# Patient Record
Sex: FEMALE | HISPANIC OR LATINO | Employment: UNEMPLOYED | URBAN - METROPOLITAN AREA
[De-identification: names, ages, dates, MRNs, and addresses within clinical notes are randomized per-mention and may not be internally consistent; named-entity substitution may affect disease eponyms.]

---

## 2019-10-31 ENCOUNTER — HOSPITAL ENCOUNTER (OUTPATIENT)
Dept: RADIOLOGY | Facility: MEDICAL CENTER | Age: 64
End: 2019-10-31
Attending: ORTHOPAEDIC SURGERY
Payer: MEDICAID

## 2019-10-31 DIAGNOSIS — Z01.811 PRE-OPERATIVE RESPIRATORY EXAMINATION: ICD-10-CM

## 2019-10-31 DIAGNOSIS — Z01.812 PRE-OPERATIVE LABORATORY EXAMINATION: ICD-10-CM

## 2019-10-31 DIAGNOSIS — Z01.810 PRE-OPERATIVE CARDIOVASCULAR EXAMINATION: ICD-10-CM

## 2019-10-31 LAB
ANION GAP SERPL CALC-SCNC: 9 MMOL/L (ref 0–11.9)
APTT PPP: 29 SEC (ref 24.7–36)
BASOPHILS # BLD AUTO: 0.7 % (ref 0–1.8)
BASOPHILS # BLD: 0.05 K/UL (ref 0–0.12)
BUN SERPL-MCNC: 16 MG/DL (ref 8–22)
CALCIUM SERPL-MCNC: 10 MG/DL (ref 8.5–10.5)
CHLORIDE SERPL-SCNC: 106 MMOL/L (ref 96–112)
CO2 SERPL-SCNC: 28 MMOL/L (ref 20–33)
CREAT SERPL-MCNC: 0.71 MG/DL (ref 0.5–1.4)
EKG IMPRESSION: NORMAL
EOSINOPHIL # BLD AUTO: 0.26 K/UL (ref 0–0.51)
EOSINOPHIL NFR BLD: 3.6 % (ref 0–6.9)
ERYTHROCYTE [DISTWIDTH] IN BLOOD BY AUTOMATED COUNT: 46.1 FL (ref 35.9–50)
ERYTHROCYTE [SEDIMENTATION RATE] IN BLOOD BY WESTERGREN METHOD: 12 MM/HOUR (ref 0–30)
EST. AVERAGE GLUCOSE BLD GHB EST-MCNC: 123 MG/DL
GLUCOSE SERPL-MCNC: 88 MG/DL (ref 65–99)
HBA1C MFR BLD: 5.9 % (ref 0–5.6)
HCT VFR BLD AUTO: 42.6 % (ref 37–47)
HGB BLD-MCNC: 13.9 G/DL (ref 12–16)
IMM GRANULOCYTES # BLD AUTO: 0.01 K/UL (ref 0–0.11)
IMM GRANULOCYTES NFR BLD AUTO: 0.1 % (ref 0–0.9)
INR PPP: 0.93 (ref 0.87–1.13)
LYMPHOCYTES # BLD AUTO: 2.39 K/UL (ref 1–4.8)
LYMPHOCYTES NFR BLD: 33.1 % (ref 22–41)
MCH RBC QN AUTO: 30.2 PG (ref 27–33)
MCHC RBC AUTO-ENTMCNC: 32.6 G/DL (ref 33.6–35)
MCV RBC AUTO: 92.4 FL (ref 81.4–97.8)
MONOCYTES # BLD AUTO: 0.41 K/UL (ref 0–0.85)
MONOCYTES NFR BLD AUTO: 5.7 % (ref 0–13.4)
NEUTROPHILS # BLD AUTO: 4.09 K/UL (ref 2–7.15)
NEUTROPHILS NFR BLD: 56.8 % (ref 44–72)
NRBC # BLD AUTO: 0 K/UL
NRBC BLD-RTO: 0 /100 WBC
PLATELET # BLD AUTO: 263 K/UL (ref 164–446)
PMV BLD AUTO: 9.3 FL (ref 9–12.9)
POTASSIUM SERPL-SCNC: 4.1 MMOL/L (ref 3.6–5.5)
PROTHROMBIN TIME: 12.6 SEC (ref 12–14.6)
RBC # BLD AUTO: 4.61 M/UL (ref 4.2–5.4)
SODIUM SERPL-SCNC: 143 MMOL/L (ref 135–145)
WBC # BLD AUTO: 7.2 K/UL (ref 4.8–10.8)

## 2019-10-31 PROCEDURE — 93005 ELECTROCARDIOGRAM TRACING: CPT | Performed by: ORTHOPAEDIC SURGERY

## 2019-10-31 PROCEDURE — 80048 BASIC METABOLIC PNL TOTAL CA: CPT

## 2019-10-31 PROCEDURE — 71046 X-RAY EXAM CHEST 2 VIEWS: CPT

## 2019-10-31 PROCEDURE — 85025 COMPLETE CBC W/AUTO DIFF WBC: CPT

## 2019-10-31 PROCEDURE — 85730 THROMBOPLASTIN TIME PARTIAL: CPT

## 2019-10-31 PROCEDURE — 85652 RBC SED RATE AUTOMATED: CPT

## 2019-10-31 PROCEDURE — 93010 ELECTROCARDIOGRAM REPORT: CPT | Performed by: INTERNAL MEDICINE

## 2019-10-31 PROCEDURE — 85610 PROTHROMBIN TIME: CPT

## 2019-10-31 PROCEDURE — 83036 HEMOGLOBIN GLYCOSYLATED A1C: CPT

## 2019-10-31 PROCEDURE — 36415 COLL VENOUS BLD VENIPUNCTURE: CPT

## 2019-10-31 RX ORDER — ATORVASTATIN CALCIUM 20 MG/1
20 TABLET, FILM COATED ORAL NIGHTLY
COMMUNITY

## 2019-10-31 RX ORDER — RISEDRONATE SODIUM 5 MG/1
5 TABLET, FILM COATED ORAL EVERY MORNING
COMMUNITY

## 2019-10-31 RX ORDER — SENNOSIDES 8.6 MG
650 CAPSULE ORAL PRN
COMMUNITY

## 2019-11-02 ENCOUNTER — ANESTHESIA (OUTPATIENT)
Dept: SURGERY | Facility: MEDICAL CENTER | Age: 64
End: 2019-11-02
Payer: MEDICAID

## 2019-11-02 ENCOUNTER — HOSPITAL ENCOUNTER (OUTPATIENT)
Facility: MEDICAL CENTER | Age: 64
End: 2019-11-02
Attending: ORTHOPAEDIC SURGERY | Admitting: ORTHOPAEDIC SURGERY
Payer: MEDICAID

## 2019-11-02 ENCOUNTER — ANESTHESIA EVENT (OUTPATIENT)
Dept: SURGERY | Facility: MEDICAL CENTER | Age: 64
End: 2019-11-02
Payer: MEDICAID

## 2019-11-02 VITALS
RESPIRATION RATE: 16 BRPM | HEART RATE: 66 BPM | WEIGHT: 144.62 LBS | OXYGEN SATURATION: 99 % | BODY MASS INDEX: 26.61 KG/M2 | TEMPERATURE: 97.5 F | DIASTOLIC BLOOD PRESSURE: 73 MMHG | SYSTOLIC BLOOD PRESSURE: 112 MMHG | HEIGHT: 62 IN

## 2019-11-02 DIAGNOSIS — G89.18 POST-OP PAIN: ICD-10-CM

## 2019-11-02 PROCEDURE — 160025 RECOVERY II MINUTES (STATS): Performed by: ORTHOPAEDIC SURGERY

## 2019-11-02 PROCEDURE — 160035 HCHG PACU - 1ST 60 MINS PHASE I: Performed by: ORTHOPAEDIC SURGERY

## 2019-11-02 PROCEDURE — 160046 HCHG PACU - 1ST 60 MINS PHASE II: Performed by: ORTHOPAEDIC SURGERY

## 2019-11-02 PROCEDURE — 160039 HCHG SURGERY MINUTES - EA ADDL 1 MIN LEVEL 3: Performed by: ORTHOPAEDIC SURGERY

## 2019-11-02 PROCEDURE — 500881 HCHG PACK, EXTREMITY: Performed by: ORTHOPAEDIC SURGERY

## 2019-11-02 PROCEDURE — 501838 HCHG SUTURE GENERAL: Performed by: ORTHOPAEDIC SURGERY

## 2019-11-02 PROCEDURE — 160002 HCHG RECOVERY MINUTES (STAT): Performed by: ORTHOPAEDIC SURGERY

## 2019-11-02 PROCEDURE — 700105 HCHG RX REV CODE 258: Performed by: ORTHOPAEDIC SURGERY

## 2019-11-02 PROCEDURE — 160048 HCHG OR STATISTICAL LEVEL 1-5: Performed by: ORTHOPAEDIC SURGERY

## 2019-11-02 PROCEDURE — 160009 HCHG ANES TIME/MIN: Performed by: ORTHOPAEDIC SURGERY

## 2019-11-02 PROCEDURE — 700111 HCHG RX REV CODE 636 W/ 250 OVERRIDE (IP): Performed by: ANESTHESIOLOGY

## 2019-11-02 PROCEDURE — 700101 HCHG RX REV CODE 250: Performed by: ANESTHESIOLOGY

## 2019-11-02 PROCEDURE — 700101 HCHG RX REV CODE 250: Performed by: ORTHOPAEDIC SURGERY

## 2019-11-02 PROCEDURE — 160028 HCHG SURGERY MINUTES - 1ST 30 MINS LEVEL 3: Performed by: ORTHOPAEDIC SURGERY

## 2019-11-02 RX ORDER — MIDAZOLAM HYDROCHLORIDE 1 MG/ML
1 INJECTION INTRAMUSCULAR; INTRAVENOUS
Status: DISCONTINUED | OUTPATIENT
Start: 2019-11-02 | End: 2019-11-02 | Stop reason: HOSPADM

## 2019-11-02 RX ORDER — SODIUM CHLORIDE, SODIUM LACTATE, POTASSIUM CHLORIDE, CALCIUM CHLORIDE 600; 310; 30; 20 MG/100ML; MG/100ML; MG/100ML; MG/100ML
INJECTION, SOLUTION INTRAVENOUS CONTINUOUS
Status: DISCONTINUED | OUTPATIENT
Start: 2019-11-02 | End: 2019-11-02

## 2019-11-02 RX ORDER — OXYCODONE HYDROCHLORIDE AND ACETAMINOPHEN 5; 325 MG/1; MG/1
1 TABLET ORAL
Status: DISCONTINUED | OUTPATIENT
Start: 2019-11-02 | End: 2019-11-02 | Stop reason: HOSPADM

## 2019-11-02 RX ORDER — HALOPERIDOL 5 MG/ML
1 INJECTION INTRAMUSCULAR
Status: DISCONTINUED | OUTPATIENT
Start: 2019-11-02 | End: 2019-11-02 | Stop reason: HOSPADM

## 2019-11-02 RX ORDER — MELATONIN
2000
Refills: 4 | COMMUNITY
Start: 2019-08-20

## 2019-11-02 RX ORDER — MEPERIDINE HYDROCHLORIDE 25 MG/ML
INJECTION INTRAMUSCULAR; INTRAVENOUS; SUBCUTANEOUS PRN
Status: DISCONTINUED | OUTPATIENT
Start: 2019-11-02 | End: 2019-11-02 | Stop reason: SURG

## 2019-11-02 RX ORDER — MEPERIDINE HYDROCHLORIDE 25 MG/ML
25 INJECTION INTRAMUSCULAR; INTRAVENOUS; SUBCUTANEOUS
Status: DISCONTINUED | OUTPATIENT
Start: 2019-11-02 | End: 2019-11-02 | Stop reason: HOSPADM

## 2019-11-02 RX ORDER — ONDANSETRON 2 MG/ML
4 INJECTION INTRAMUSCULAR; INTRAVENOUS
Status: DISCONTINUED | OUTPATIENT
Start: 2019-11-02 | End: 2019-11-02 | Stop reason: HOSPADM

## 2019-11-02 RX ORDER — OXYCODONE HYDROCHLORIDE AND ACETAMINOPHEN 5; 325 MG/1; MG/1
2 TABLET ORAL
Status: DISCONTINUED | OUTPATIENT
Start: 2019-11-02 | End: 2019-11-02 | Stop reason: HOSPADM

## 2019-11-02 RX ORDER — SODIUM CHLORIDE, SODIUM LACTATE, POTASSIUM CHLORIDE, CALCIUM CHLORIDE 600; 310; 30; 20 MG/100ML; MG/100ML; MG/100ML; MG/100ML
INJECTION, SOLUTION INTRAVENOUS CONTINUOUS
Status: DISCONTINUED | OUTPATIENT
Start: 2019-11-02 | End: 2019-11-02 | Stop reason: HOSPADM

## 2019-11-02 RX ORDER — GABAPENTIN 300 MG/1
CAPSULE ORAL
Refills: 0 | COMMUNITY
Start: 2019-10-24

## 2019-11-02 RX ORDER — OMEPRAZOLE 20 MG/1
10 CAPSULE, DELAYED RELEASE ORAL DAILY
COMMUNITY

## 2019-11-02 RX ORDER — CEFAZOLIN SODIUM 1 G/3ML
INJECTION, POWDER, FOR SOLUTION INTRAMUSCULAR; INTRAVENOUS PRN
Status: DISCONTINUED | OUTPATIENT
Start: 2019-11-02 | End: 2019-11-02 | Stop reason: SURG

## 2019-11-02 RX ORDER — HYDROCODONE BITARTRATE AND ACETAMINOPHEN 5; 325 MG/1; MG/1
1-2 TABLET ORAL EVERY 6 HOURS PRN
Qty: 20 TAB | Refills: 0 | Status: SHIPPED | OUTPATIENT
Start: 2019-11-02 | End: 2019-11-09

## 2019-11-02 RX ORDER — DIPHENHYDRAMINE HYDROCHLORIDE 50 MG/ML
12.5 INJECTION INTRAMUSCULAR; INTRAVENOUS
Status: DISCONTINUED | OUTPATIENT
Start: 2019-11-02 | End: 2019-11-02 | Stop reason: HOSPADM

## 2019-11-02 RX ADMIN — SODIUM CHLORIDE, POTASSIUM CHLORIDE, SODIUM LACTATE AND CALCIUM CHLORIDE: 600; 310; 30; 20 INJECTION, SOLUTION INTRAVENOUS at 12:40

## 2019-11-02 RX ADMIN — ROCURONIUM BROMIDE 5 MG: 10 INJECTION, SOLUTION INTRAVENOUS at 13:57

## 2019-11-02 RX ADMIN — PROPOFOL 30 MG: 10 INJECTION, EMULSION INTRAVENOUS at 14:00

## 2019-11-02 RX ADMIN — LIDOCAINE HYDROCHLORIDE 0.5 ML: 10 INJECTION, SOLUTION INFILTRATION; PERINEURAL at 12:40

## 2019-11-02 RX ADMIN — SUCCINYLCHOLINE CHLORIDE 40 MG: 20 INJECTION, SOLUTION INTRAMUSCULAR; INTRAVENOUS at 14:00

## 2019-11-02 RX ADMIN — MEPERIDINE HYDROCHLORIDE 12.5 MG: 25 INJECTION INTRAMUSCULAR; INTRAVENOUS; SUBCUTANEOUS at 14:08

## 2019-11-02 RX ADMIN — ALFENTANIL HYDROCHLORIDE 750 MCG: 500 INJECTION INTRAVENOUS at 13:57

## 2019-11-02 RX ADMIN — PROPOFOL 20 MG: 10 INJECTION, EMULSION INTRAVENOUS at 13:57

## 2019-11-02 RX ADMIN — LIDOCAINE HYDROCHLORIDE 40 MG: 20 INJECTION, SOLUTION INTRAVENOUS at 13:57

## 2019-11-02 RX ADMIN — MEPERIDINE HYDROCHLORIDE 12.5 MG: 25 INJECTION INTRAMUSCULAR; INTRAVENOUS; SUBCUTANEOUS at 14:17

## 2019-11-02 RX ADMIN — CEFAZOLIN 2 G: 330 INJECTION, POWDER, FOR SOLUTION INTRAMUSCULAR; INTRAVENOUS at 13:55

## 2019-11-02 ASSESSMENT — PAIN SCALES - GENERAL: PAIN_LEVEL: 0

## 2019-11-02 NOTE — ANESTHESIA PREPROCEDURE EVALUATION
Relevant Problems   No relevant active problems       Physical Exam    Airway   Mallampati: II  TM distance: >3 FB  Neck ROM: full       Cardiovascular - normal exam  Rhythm: regular  Rate: normal     Dental - normal exam         Pulmonary - normal exam  Breath sounds clear to auscultation     Abdominal    Neurological - normal exam                 Anesthesia Plan    ASA 2       Plan - general       Airway plan will be LMA        Induction: intravenous    Postoperative Plan: Postoperative administration of opioids is intended.    Pertinent diagnostic labs and testing reviewed    Informed Consent:    Anesthetic plan and risks discussed with patient.    Use of blood products discussed with: patient whom consented to blood products.

## 2019-11-02 NOTE — ANESTHESIA POSTPROCEDURE EVALUATION
Patient: Mary Carter    Procedure Summary     Date:  11/02/19 Room / Location:  [facility] 07 / SURGERY [facility]    Anesthesia Start:  1355 Anesthesia Stop:  1430    Procedure:  RELEASE, CARPAL TUNNEL (Left Wrist) Diagnosis:  (LEFT CARPAL TUNNEL SYNDROME)    Surgeon:  Nikunj Vela M.D. Responsible Provider:  Eric Wilkes M.D.    Anesthesia Type:  general ASA Status:  2          Final Anesthesia Type: general  Last vitals  BP   Blood Pressure: 109/74    Temp   36.7 °C (98 °F)    Pulse   Pulse: 68   Resp   17    SpO2   97 %      Anesthesia Post Evaluation    Patient location during evaluation: PACU  Patient participation: complete - patient participated  Level of consciousness: awake and alert  Pain score: 0    Airway patency: patent  Anesthetic complications: no  Cardiovascular status: hemodynamically stable  Respiratory status: acceptable  Hydration status: euvolemic    PONV: none           Nurse Pain Score: 0 (NPRS)

## 2019-11-02 NOTE — ANESTHESIA PROCEDURE NOTES
Airway  Date/Time: 11/2/2019 2:01 PM  Performed by: Eric Wilkes M.D.  Authorized by: Eric Wilkes M.D.     Location:  OR  Urgency:  Elective  Indications for Airway Management:  Anesthesia  Spontaneous Ventilation: absent    Sedation Level:  Deep  Preoxygenated: Yes    Patient Position:  Sniffing  Final Airway Type:  Supraglottic airway  Final Supraglottic Airway:  Standard LMA  SGA Size:  4  Number of Attempts at Approach:  1

## 2019-11-02 NOTE — DISCHARGE INSTRUCTIONS
ACTIVIDAD: Descansa y tómalo con calma meghann las primeras 24 horas.  Se recomienda a un adulto responsable que permanezca con usted meghann paco tiempo.  Es normal sentir se sienta somnoliento.  Le animamos a no hacer nada que requiera equilibrio, juicio o coordinación.    LOS SÍNTOMAS LEVES SIMILARES A LOS DE LA GRIPE SON NORMALES. USTED PUEDE EXPERIMENTAR ACHES DE MASA GENERALIZADA, IRRITATION DE THROAT, HEADACHE Y/O ALGUNAS NÁUSEAS.    MEGHANN 24 HORAS NO:  Conducir, operar maquinaria o manejar electrodomésticos.  Mesha cerveza o bebidas alcohólicas.   [place] decisiones importantes o firme documentos legales.    INSTRUCCIONES ESPECIALES:   Retire el apósito en 4 días   Seguimiento con el Dr. Vela en 2 semanas    Instrucciones de descarga para la liberación del túnel carpiano  Te sometiste a un procedimiento de liberación del túnel carpiano para ayudar a aliviar los síntomas del síndrome del túnel carpiano. En el síndrome del túnel carpiano, un nervio en la jaziel se comprime e irrita. [place] causa entumecimiento y dolor en los dedos y la mano. La liberación del túnel carpiano facilita la compresión del nervio. Aquí hay instrucciones que le ayudarán a cuidar mckeon brazo y jaziel cuando esté en casa.  Atención domiciliaria  No agarres los objetos con fuerza ni levantes con el brazo afectado.  Use mckeon venda, férula o yafundida según las indicaciones de mckeon médico.  Mantenga siempre el apósito, la férula o el molde seco y limpio.  Cuando se duche, cúbrase la mano y la jaziel con plástico y use cinta adhesiva o bandas de goma para mantener el apósito, la férula o el yeso seco. Ducha según sea necesario.    [place]sa deborah bolsa de hielo, deborah bolsa de guisantes congelados o algo similar envuelto en deborah toalla delgada en la jaziel. Utilícelo para reducir la hinchazón meghann las primeras 48 horas. Dejar la bolsa de hielo encendida meghann 20 minutos y luego quitarla meghann 20 minutos. Repita según sea necesario.  Mantén el brazo  elevado por encima del corazón tez 24 a 48 horas después de la cirugía.  Rigoberto los ejercicios que aprendió en el hospital o según las instrucciones de mckeon médico.  [place] analgésicos según las instrucciones.  No conduzcas hasta que el médico te diga que está john. Nunca conduzca mientras esté tomando analgésicos opioides.  Pregúntale al médico cuándo puedes volver al trabajo. Si mckeon trabajo requiere levantar objetos pesados, es posible que no pueda volver a trabajar tez varias semanas.  Atención de seguimiento  Pide deborah consulta de seguimiento según las indicaciones de tu médico.  Llame al 911  Llame al 911 de inmediato si tiene alguno de los siguientes:  Dolor en el pecho  Dificultad para respirar  Cuándo llamar a mckeon proveedor de atención médica  Llame a mckeon proveedor de atención médica de inmediato si tiene alguno de los siguientes:  Deborah férula, yeso o apósito mojado  Aumento del sangrado o drenaje del pilar (incisión)  Apertura de la incisión  Fiebre de 100,4 oF (38 oC) o superior, o según las indicaciones de mckeon proveedor de atención médica  Escalofríos temblorosos  Cualquier nuevo entumecimiento en los dedos o el pulgar  Mano oxana o dedos  El dolor empeora con o sin actividad  El enrojecimiento, sensibilidad o hinchazón de la incisión empeora    DIETA: Para evitar las náuseas, avance lentamente la dieta según lo tolerado, evitando los alimentos picantes o grasosos tez el primer día.  Agregue alimentos más sustanciales a mckeon dieta de acuerdo con las instrucciones de mckeon médico.  Los bebés pueden ser alimentados con leche maternizada o leche materna tan pronto nael tengan hambre.  AUMENTA LOS FLUIDOS Y LA FIBRA PARA EVITAR EL CONSTIPATION.    SURGICAL DRESSING/BATHING: Retire el apósito en 4 días (el 6 de noviembre)    PAOLA SIGUIENTE:  Se debe concertar deborah paola de seguimiento con mckeon médico en 2 semanas; llamar a programar.    Debe LLAMAR A MCKEON Físico si desarrolla:  Fiebre superior a 101 grados FEfrain [employer] no  aliviado por medicamentos, o náuseas o vómitos persistentes.  Sangrado excesivo (zaid empapada a través del vendaje) o drenaje inesperado de la herida.  Enrojecimiento o hinchazón extremos alrededor del lugar de la incisión, drenaje de pus o drenaje con olor fétido.  Incapacidad para orinar o vaciar la vejiga en un plazo de 8 horas.  Problemas con la respiración o dolor en el pecho.    Debe llamar al 911 si presenta problemas con la respiración o dolor en el pecho.  Si no puede comunicarse con mckeon médico o centro quirúrgico, debe ir a la evita de emergencias o centro de atención de urgencia más cercano.  Teléfono del médico: [phone]    Si surge alguna pregunta, llame a mckeon médico.  Si mckeon médico no está disponible, no dude en llamar al [facility] al [phone].  [place] está abierto de lunes a viernes de 7AM a 7PM.  También puede llamar a la LÍNEA directa de neil abierta las 24 horas del día, los 7 dave de la semana y hablar con deborah enfermera al [phone], o al número gratuito [phone].    Deborah enfermera registrada puede llamarlo unos días después de la cirugía para zane cómo le va después del procedimiento.    MEDICAMENTOS: Reanudar la jose de medicamentos diarios.  [place] analgésicos recetados con alimentos.  Si no se receta ningún medicamento, puedes coyt analgésicos sin aspirina si es necesario.  LA MEDICACIÓN DEL DOLOR PUEDE SER MUY CONSTIPADO.  [place] un ablandador de heces o un laxante nael senokot, pericolace o leche de magnesia si es necesario.    Prescripción aisha para [place] (dolor).  [place] dosis de analgésico sin dolor debida en cualquier momento, ninguna por vía oral en [place].    Si mckeon médico le ha recetado analgésicos que incluyen paracetamol (Tylenol), no tome paracetamol adicional (Tylenol) mientras jose el medicamento prescrito.      ACTIVITY: Rest and take it easy for the first 24 hours.  A responsible adult is recommended to remain with you during that time.  It is normal  to feel sleepy.  We encourage you to not do anything that requires balance, judgment or coordination.    MILD FLU-LIKE SYMPTOMS ARE NORMAL. YOU MAY EXPERIENCE GENERALIZED MUSCLE ACHES, THROAT IRRITATION, HEADACHE AND/OR SOME NAUSEA.    FOR 24 HOURS DO NOT:  Drive, operate machinery or run household appliances.  Drink beer or alcoholic beverages.   Make important decisions or sign legal documents.    SPECIAL INSTRUCTIONS:   Remove dressing in 4 days   Follow up with Dr. Vela in 2 weeks    Discharge Instructions for Carpal Tunnel Release  You had a carpal tunnel release procedure to help ease the symptoms of carpal tunnel syndrome. In carpal tunnel syndrome, a nerve in the wrist is compressed and irritated. This causes numbness and pain in the fingers and hand. Carpal tunnel release eases the compression of the nerve. Here are instructions that will help you care for your arm and wrist when you are at home.  Home care  · Don't  objects tightly or lift with your affected arm.  · Wear your bandage, splint, or cast as directed by your doctor.  · Always keep the dressing, splint, or cast dry and clean.  · When showering, cover your hand and wrist with plastic and use tape or rubber bands to keep the dressing, splint, or cast dry. Shower as needed.    · Use an ice pack, bag of frozen peas, or something similar wrapped in a thin towel on your wrist. Use it to reduce swelling for the first 48 hours. Leave the ice pack on for 20 minutes, then take it off for 20 minutes. Repeat as needed.  · Keep your arm elevated above your heart for 24 to 48 hours after surgery.  · Do the exercises you learned in the hospital, or as instructed by your doctor.  · Take pain medicine as directed.  · Don’t drive until your doctor says it’s OK. Never drive while you are taking opioid pain medicine.  · Ask your doctor when you can return to work. If your job requires heavy lifting, you may not be able to begin working again for several  weeks.  Follow-up care  Make a follow-up appointment as directed by your doctor.  Call 911  Call 911 right away if you have any of the following:  · Chest pain  · Shortness of breath  When to call your healthcare provider  Call your healthcare provider right away if you have any of the following:  · A splint, cast, or dressing that is wet  · Increased bleeding or drainage from the cut (incision)  · Opening of the incision  · Fever of 100.4°F (38°C) or higher, or as directed by your healthcare provider  · Shaking chills  · Any new numbness in the fingers or thumb  · Blue hand or fingers  · Pain gets worse with or without activity  · Redness, tenderness, or swelling of the incision gets worse    DIET: To avoid nausea, slowly advance diet as tolerated, avoiding spicy or greasy foods for the first day.  Add more substantial food to your diet according to your physician's instructions.  Babies can be fed formula or breast milk as soon as they are hungry.  INCREASE FLUIDS AND FIBER TO AVOID CONSTIPATION.    SURGICAL DRESSING/BATHING: Remove dressing in 4 days (on November 6th)    FOLLOW-UP APPOINTMENT:  A follow-up appointment should be arranged with your doctor in 2 weeks; call to schedule.    You should CALL YOUR PHYSICIAN if you develop:  Fever greater than 101 degrees F.  Pain not relieved by medication, or persistent nausea or vomiting.  Excessive bleeding (blood soaking through dressing) or unexpected drainage from the wound.  Extreme redness or swelling around the incision site, drainage of pus or foul smelling drainage.  Inability to urinate or empty your bladder within 8 hours.  Problems with breathing or chest pain.    You should call 911 if you develop problems with breathing or chest pain.  If you are unable to contact your doctor or surgical center, you should go to the nearest emergency room or urgent care center.  Physician's telephone #: [phone]    If any questions arise, call your doctor.  If your  doctor is not available, please feel free to call the Surgical Center at [phone].  The Center is open Monday through Friday from 7AM to 7PM.  You can also call the HEALTH HOTLINE open 24 hours/day, 7 days/week and speak to a nurse at [phone], or toll free at [phone].    A registered nurse may call you a few days after your surgery to see how you are doing after your procedure.    MEDICATIONS: Resume taking daily medication.  Take prescribed pain medication with food.  If no medication is prescribed, you may take non-aspirin pain medication if needed.  PAIN MEDICATION CAN BE VERY CONSTIPATING.  Take a stool softener or laxative such as senokot, pericolace, or milk of magnesia if needed.    Prescription given for Norco (pain).  First dose of pain medication due at anytime, none given orally at [facility].     If your physician has prescribed pain medication that includes Acetaminophen (Tylenol), do not take additional Acetaminophen (Tylenol) while taking the prescribed medication.    Depression / Suicide Risk    As you are discharged from this [facility], it is important to learn how to keep safe from harming yourself.    Recognize the warning signs:  · Abrupt changes in personality, positive or negative- including increase in energy   · Giving away possessions  · Change in eating patterns- significant weight changes-  positive or negative  · Change in sleeping patterns- unable to sleep or sleeping all the time   · Unwillingness or inability to communicate  · Depression  · Unusual sadness, discouragement and loneliness  · Talk of wanting to die  · Neglect of personal appearance   · Rebelliousness- reckless behavior  · Withdrawal from people/activities they love  · Confusion- inability to concentrate     If you or a loved one observes any of these behaviors or has concerns about self-harm, here's what you can do:  · Talk about it- your feelings and reasons for harming yourself  · Remove any  means that you might use to hurt yourself (examples: pills, rope, extension cords, firearm)  · Get professional help from the community (Mental Health, Substance Abuse, psychological counseling)  · Do not be alone:Call your Safe Contact- someone whom you trust who will be there for you.  · Call your local CRISIS HOTLINE 999-8240 or 996-001-2788  · Call your local Children's Mobile Crisis Response Team [place] (707) 166-2261 or www.[place]  · Call the toll free National Suicide Prevention Hotlines   · National Suicide Prevention Lifeline 580-513-STVO (2039)  · National Hope Line Network 800-SUICIDE (580-0014)

## 2019-11-02 NOTE — OP REPORT
DATE OF SERVICE:  11/02/2019    SERVICE:  Orthopedic surgery.    PREOPERATIVE DIAGNOSES:  1.  Left wrist carpal tunnel syndrome.  2.  Volar radial ganglion cyst.    POSTOPERATIVE DIAGNOSES:  1.  Left wrist carpal tunnel syndrome.  2.  Volar radial ganglion cyst.    PROCEDURES:  1.  Left carpal tunnel release, open.  2.  Left volar ganglion cyst removal.    SURGEON:  Nikunj Vela MD    ASSISTANT SURGEON:  None.    ANESTHETIC:  General.    ANESTHESIOLOGIST:  Eric Wilkes MD    COMPLICATIONS:  None.    BLOOD LOSS:  2 mL.    HISTORY AND INDICATIONS:  The patient is a 64-year-old female with chief   complaint of left hand numbness radiating pain, tingling.  She had a positive   EMG/nerve conduction study, which showed severe carpal tunnel syndrome.  She   also reported that she had sensation of a cyst in the area where the incision   would be made for the carpal tunnel syndrome.  She did have a small lesion   that was palpable on the volar aspect of the radial side of the wrist.  After   further discussion with her, I did tell her that if it was in my field of   surgery that I would remove the cyst.  However, given that volar cyst can   often be associated with the radial artery and other vital structures if it   was a severely complicated cyst, I would not remove it.    DESCRIPTION OF PROCEDURE:  After informed consent was obtained, the patient   was brought to the operating room and given general anesthetic.  Her left   upper extremity was prepped and draped in the usual sterile fashion after   Ancef was given.  After the field was draped, a time-out was called correctly   identifying the patient and the procedure to be done.  The limb was then   exsanguinated and the tourniquet was inflated to 250 mmHg.  We then made a   longitudinal midline incision.  I carefully dissected down through   subcutaneous tissue.  I did dissect subcutaneously proximally where the   patient complained of her sensation of a ganglion  cyst.  I was able to   localize this cyst undermining tissue using the same incision I was using for   the carpal tunnel.  I carefully skeletonized the cyst and removed it.  I did   tell the patient  before the operation that sometimes these cysts do recur.    I then carefully dissected down through subcutaneous tissue down to the   palmaris brevis fascia.  We dissected through the palmaris brevis fascia and   through the palmaris brevis musculature.  We then carefully dissected through   the transverse carpal ligament.  The median nerve was then visualized.  We   extended our incision proximally and distally.  We got completely dividing the   transverse carpal ligament.  A Audrey clamp was placed to palpate distally and   proximally to make sure the entire carpal tunnel had been released, which it   was.  The wound was then irrigated and closed in layers.  Subcutaneous tissue   was closed with 2-0 Vicryl and the skin closure was completed with a 3-0 nylon   suture.  I injected approximately 5 mL of 0.5% Marcaine with no epinephrine   into the wound as local anesthetic.  We then applied a dressing, and the   tourniquet was let down at approximately 14 minutes.  No complications were   experienced.  Blood loss was 2 mL.       ____________________________________     MD BRETT JIMENEZ / YESI    DD:  11/02/2019 14:33:56  DT:  11/02/2019 15:30:00    D#:  [id number]  Job#:  [id number]

## 2019-11-02 NOTE — OR SURGEON
Immediate Post OP Note    PreOp Diagnosis: L carpal tunnel syndrome volar ganglion cyst    PostOp Diagnosis: same    Procedure(s):L  RELEASE, CARPAL TUNNEL  Volsar ganglion cyst removal- Wound Class: Clean    Surgeon(s):  Nikunj Vela M.D.    Anesthesiologist/Type of Anesthesia:  Anesthesiologist: Eric Wilkes M.D./General    Surgical Staff:  Circulator: Lukas D. Gansert, R.N.  Scrub Person: Layla Marcus    Specimens removed if any:  none    Estimated Blood Loss: 2cc    Findings: volar ganglion cust    Complications: none        11/2/2019 2:28 PM Nikunj Vela M.D.

## 2019-11-02 NOTE — PROGRESS NOTES
Pre-op complete. Patient is Thai speaking only, she prefers for her daughterKacy to translate and signed the refusal of  form. Patient and family updated on plan of care. All questions answered at this time.  Call light within reach, advised to call for any questions or concerns. Hourly rounding in place.

## 2019-11-02 NOTE — ANESTHESIA TIME REPORT
Anesthesia Start and Stop Event Times     Date Time Event    11/2/2019 1355 Anesthesia Start     1430 Anesthesia Stop        Responsible Staff  11/02/19    Name Role Begin End    Eric Wilkes M.D. Anesth 1355 1430        Preop Diagnosis (Free Text):  Pre-op Diagnosis     LEFT CARPAL TUNNEL SYNDROME        Preop Diagnosis (Codes):    Post op Diagnosis  CTS (carpal tunnel syndrome)      Premium Reason  E. Weekend    Comments:

## 2019-11-02 NOTE — OR NURSING
Pt arrived to phase II. Pt states pain is 5/10 but tolerable and is motivated to go home. D/C instructions given to pt and family, verbalized understanding. PIV removed prior to D/C.

## 2020-11-16 ENCOUNTER — HOSPITAL ENCOUNTER (OUTPATIENT)
Dept: RADIOLOGY | Facility: MEDICAL CENTER | Age: 65
End: 2020-11-16
Attending: PHYSICIAN ASSISTANT
Payer: MEDICAID

## 2020-11-16 DIAGNOSIS — Z80.0 FAMILY HISTORY OF COLON CANCER: ICD-10-CM

## 2020-11-16 DIAGNOSIS — R19.4 CHANGE IN BOWEL HABITS: ICD-10-CM

## 2020-11-16 PROCEDURE — 74018 RADEX ABDOMEN 1 VIEW: CPT

## 2022-06-10 ENCOUNTER — OFFICE VISIT (OUTPATIENT)
Dept: URGENT CARE | Facility: PHYSICIAN GROUP | Age: 67
End: 2022-06-10
Payer: COMMERCIAL

## 2022-06-10 VITALS
RESPIRATION RATE: 16 BRPM | DIASTOLIC BLOOD PRESSURE: 66 MMHG | SYSTOLIC BLOOD PRESSURE: 112 MMHG | BODY MASS INDEX: 28.07 KG/M2 | OXYGEN SATURATION: 98 % | HEART RATE: 85 BPM | HEIGHT: 60 IN | TEMPERATURE: 97.5 F | WEIGHT: 143 LBS

## 2022-06-10 DIAGNOSIS — J22 LRTI (LOWER RESPIRATORY TRACT INFECTION): ICD-10-CM

## 2022-06-10 PROCEDURE — 99203 OFFICE O/P NEW LOW 30 MIN: CPT | Performed by: PHYSICIAN ASSISTANT

## 2022-06-10 RX ORDER — AZITHROMYCIN 250 MG/1
TABLET, FILM COATED ORAL
Qty: 6 TABLET | Refills: 0 | Status: SHIPPED | OUTPATIENT
Start: 2022-06-10

## 2022-06-10 ASSESSMENT — ENCOUNTER SYMPTOMS
MYALGIAS: 1
COUGH: 1
SPUTUM PRODUCTION: 1
NAUSEA: 0
WHEEZING: 0
VOMITING: 0
SORE THROAT: 0
DIAPHORESIS: 0
ABDOMINAL PAIN: 0
SINUS PAIN: 0
HEADACHES: 0
FEVER: 0
SHORTNESS OF BREATH: 0
CHILLS: 1
DIARRHEA: 0
PALPITATIONS: 0
DIZZINESS: 0

## 2022-06-10 NOTE — PROGRESS NOTES
Subjective:     CHIEF COMPLAINT  Chief Complaint   Patient presents with   • Cough     Body aches, runny nose, chest congestion x2 weeks        HPI  Mary Carter is a very pleasant 66 y.o. female who presents to the clinic with a persistent cough x2 weeks.  Believes she likely had the flu at the onset of symptoms.  Multiple contacts have subsequently developed influenza A.  Currently dealing with a cough that is productive of thick green sputum.  Continue to have generalized body aches and chills.  Does not believe she has been running a fever.  Has generalized discomfort in her chest after coughing fits.  No shortness of breath.  Tolerating oral intake without complication.  No history of asthma or COPD.  Taking OTC cough and cold medication with no improvement.  Recently started Tessalon Perles without any relief.    REVIEW OF SYSTEMS  Review of Systems   Constitutional: Positive for chills and malaise/fatigue. Negative for diaphoresis and fever.   HENT: Positive for congestion. Negative for ear pain, sinus pain and sore throat.    Respiratory: Positive for cough and sputum production. Negative for shortness of breath and wheezing.    Cardiovascular: Negative for chest pain and palpitations.   Gastrointestinal: Negative for abdominal pain, diarrhea, nausea and vomiting.   Musculoskeletal: Positive for myalgias.   Neurological: Negative for dizziness and headaches.   Endo/Heme/Allergies: Negative for environmental allergies.       PAST MEDICAL HISTORY  There are no problems to display for this patient.      SURGICAL HISTORY   has a past surgical history that includes hysterectomy, total abdominal; appendectomy; shoulder surgery (Right); and revise median n/carpal tunnel surg (Left, 11/2/2019).    ALLERGIES  No Known Allergies    CURRENT MEDICATIONS  Home Medications     Reviewed by Keith Yan P.A.-C. (Physician Assistant) on 06/10/22 at 1411  Med List Status: <None>   Medication Last Dose Status    acetaminophen (TYLENOL) 650 MG CR tablet Taking Active   atorvastatin (LIPITOR) 20 MG Tab Taking Active   CALCIUM PO Taking Active   gabapentin (NEURONTIN) 300 MG Cap Not Taking Active   Omega-3 Fatty Acids (FISH OIL PO) Taking Active   omeprazole (PRILOSEC) 20 MG delayed-release capsule Taking Active   risedronate (ACTONEL) 5 MG Tab Taking Active   vitamin D3, cholecalciferol, 1000 UNIT Tab Taking Active   VITAMIN E PO Taking Active                SOCIAL HISTORY  Social History     Tobacco Use   • Smoking status: Never Smoker   • Smokeless tobacco: Never Used   Vaping Use   • Vaping Use: Never used   Substance and Sexual Activity   • Alcohol use: No   • Drug use: No   • Sexual activity: Never       FAMILY HISTORY  History reviewed. No pertinent family history.       Objective:     VITAL SIGNS: /66   Pulse 85   Temp 36.4 °C (97.5 °F) (Temporal)   Resp 16   Ht 1.524 m (5')   Wt 64.9 kg (143 lb)   LMP  (LMP Unknown)   SpO2 98%   BMI 27.93 kg/m²     PHYSICAL EXAM  Physical Exam  Constitutional:       General: She is not in acute distress.     Appearance: Normal appearance. She is not ill-appearing, toxic-appearing or diaphoretic.   HENT:      Head: Normocephalic and atraumatic.      Right Ear: Tympanic membrane, ear canal and external ear normal.      Left Ear: Tympanic membrane, ear canal and external ear normal.      Nose: Congestion and rhinorrhea present.      Mouth/Throat:      Mouth: Mucous membranes are moist.      Pharynx: Posterior oropharyngeal erythema present. No oropharyngeal exudate.   Eyes:      Conjunctiva/sclera: Conjunctivae normal.   Cardiovascular:      Rate and Rhythm: Normal rate and regular rhythm.      Pulses: Normal pulses.      Heart sounds: Normal heart sounds.   Pulmonary:      Effort: Pulmonary effort is normal.      Breath sounds: Normal breath sounds. No wheezing, rhonchi or rales.   Musculoskeletal:      Cervical back: Normal range of motion. No muscular tenderness.    Lymphadenopathy:      Cervical: No cervical adenopathy.   Skin:     General: Skin is warm and dry.      Capillary Refill: Capillary refill takes less than 2 seconds.   Neurological:      Mental Status: She is alert.   Psychiatric:         Mood and Affect: Mood normal.         Thought Content: Thought content normal.         Assessment/Plan:     1. LRTI (lower respiratory tract infection)  - azithromycin (ZITHROMAX) 250 MG Tab; Take 2 tablets PO on day one, then 1 tablet PO on day two to five.  Dispense: 6 Tablet; Refill: 0      MDM/Comments:    -Patient symptoms have been present and progressively worsening x2 weeks.  On exam lung sounds clear to auscultation.  No wheezes rhonchi rales.  SPO2 98% on room air.  Low suspicion for pneumonia.  Due to the nature and length of illness we will begin treatment with azithromycin.  Supportive indications discussed.  Follow-up precautions discussed.    Symptomatic care.  -Oral hydration and rest.   -Over the counter expectorant as directed; Guaifenesin (Mucinex).  -Diphenhydramine as directed for rhinorrhea (runny nose) and sneezing.  --May take over the counter pseudoephedrine for nasal congestion. Can increase your blood pressure.  -Tylenol or ibuprofen for pain and fever as directed.   -Saline nasal spray as a decongestant.    Follow up for shortness of breath, fevers, elevated heart rate, weakness, prolonged cough, chest pain, or any other concerns.      Differential diagnosis, natural history, supportive care, and indications for immediate follow-up discussed. All questions answered. Patient agrees with the plan of care.    Follow-up as needed if symptoms worsen or fail to improve to PCP, Urgent care or Emergency Room.    I have personally reviewed prior external notes and test results pertinent to today's visit.  I have independently reviewed and interpreted all diagnostics ordered during this urgent care acute visit.   Discussed management options (risks,benefits, and  alternatives to treatment). Pt expresses understanding and the treatment plan was agreed upon. Questions were encouraged and answered to pt's satisfaction.    Please note that this dictation was created using voice recognition software. I have made a reasonable attempt to correct obvious errors, but I expect that there are errors of grammar and possibly content that I did not discover before finalizing the note.

## 2022-10-13 ENCOUNTER — HOSPITAL ENCOUNTER (OUTPATIENT)
Facility: MEDICAL CENTER | Age: 67
End: 2022-10-13
Attending: ORTHOPAEDIC SURGERY | Admitting: ORTHOPAEDIC SURGERY
Payer: COMMERCIAL

## 2025-01-11 ENCOUNTER — APPOINTMENT (OUTPATIENT)
Dept: RADIOLOGY | Facility: MEDICAL CENTER | Age: 70
End: 2025-01-11
Attending: STUDENT IN AN ORGANIZED HEALTH CARE EDUCATION/TRAINING PROGRAM
Payer: COMMERCIAL

## 2025-01-11 ENCOUNTER — HOSPITAL ENCOUNTER (EMERGENCY)
Facility: MEDICAL CENTER | Age: 70
End: 2025-01-11
Attending: STUDENT IN AN ORGANIZED HEALTH CARE EDUCATION/TRAINING PROGRAM
Payer: COMMERCIAL

## 2025-01-11 VITALS
WEIGHT: 138.89 LBS | OXYGEN SATURATION: 95 % | HEART RATE: 70 BPM | SYSTOLIC BLOOD PRESSURE: 155 MMHG | BODY MASS INDEX: 25.56 KG/M2 | RESPIRATION RATE: 16 BRPM | DIASTOLIC BLOOD PRESSURE: 85 MMHG | TEMPERATURE: 97.3 F | HEIGHT: 62 IN

## 2025-01-11 DIAGNOSIS — K80.50 BILIARY COLIC: ICD-10-CM

## 2025-01-11 DIAGNOSIS — K80.20 CALCULUS OF GALLBLADDER WITHOUT CHOLECYSTITIS WITHOUT OBSTRUCTION: Primary | ICD-10-CM

## 2025-01-11 LAB
ALBUMIN SERPL BCP-MCNC: 4 G/DL (ref 3.2–4.9)
ALBUMIN/GLOB SERPL: 1.4 G/DL
ALP SERPL-CCNC: 114 U/L (ref 30–99)
ALT SERPL-CCNC: 31 U/L (ref 2–50)
ANION GAP SERPL CALC-SCNC: 10 MMOL/L (ref 7–16)
APPEARANCE UR: CLEAR
AST SERPL-CCNC: 27 U/L (ref 12–45)
BASOPHILS # BLD AUTO: 0.6 % (ref 0–1.8)
BASOPHILS # BLD: 0.04 K/UL (ref 0–0.12)
BILIRUB SERPL-MCNC: 0.3 MG/DL (ref 0.1–1.5)
BILIRUB UR QL STRIP.AUTO: NEGATIVE
BUN SERPL-MCNC: 15 MG/DL (ref 8–22)
CALCIUM ALBUM COR SERPL-MCNC: 9.5 MG/DL (ref 8.5–10.5)
CALCIUM SERPL-MCNC: 9.5 MG/DL (ref 8.5–10.5)
CHLORIDE SERPL-SCNC: 108 MMOL/L (ref 96–112)
CO2 SERPL-SCNC: 24 MMOL/L (ref 20–33)
COLOR UR: YELLOW
CREAT SERPL-MCNC: 0.73 MG/DL (ref 0.5–1.4)
EOSINOPHIL # BLD AUTO: 0.51 K/UL (ref 0–0.51)
EOSINOPHIL NFR BLD: 8 % (ref 0–6.9)
ERYTHROCYTE [DISTWIDTH] IN BLOOD BY AUTOMATED COUNT: 51.8 FL (ref 35.9–50)
GFR SERPLBLD CREATININE-BSD FMLA CKD-EPI: 90 ML/MIN/1.73 M 2
GLOBULIN SER CALC-MCNC: 2.8 G/DL (ref 1.9–3.5)
GLUCOSE SERPL-MCNC: 91 MG/DL (ref 65–99)
GLUCOSE UR STRIP.AUTO-MCNC: NEGATIVE MG/DL
HCT VFR BLD AUTO: 41.3 % (ref 37–47)
HGB BLD-MCNC: 13.6 G/DL (ref 12–16)
IMM GRANULOCYTES # BLD AUTO: 0.01 K/UL (ref 0–0.11)
IMM GRANULOCYTES NFR BLD AUTO: 0.2 % (ref 0–0.9)
KETONES UR STRIP.AUTO-MCNC: NEGATIVE MG/DL
LEUKOCYTE ESTERASE UR QL STRIP.AUTO: NEGATIVE
LIPASE SERPL-CCNC: 51 U/L (ref 11–82)
LYMPHOCYTES # BLD AUTO: 1.92 K/UL (ref 1–4.8)
LYMPHOCYTES NFR BLD: 30.2 % (ref 22–41)
MCH RBC QN AUTO: 30.4 PG (ref 27–33)
MCHC RBC AUTO-ENTMCNC: 32.9 G/DL (ref 32.2–35.5)
MCV RBC AUTO: 92.4 FL (ref 81.4–97.8)
MICRO URNS: NORMAL
MONOCYTES # BLD AUTO: 0.47 K/UL (ref 0–0.85)
MONOCYTES NFR BLD AUTO: 7.4 % (ref 0–13.4)
NEUTROPHILS # BLD AUTO: 3.41 K/UL (ref 1.82–7.42)
NEUTROPHILS NFR BLD: 53.6 % (ref 44–72)
NITRITE UR QL STRIP.AUTO: NEGATIVE
NRBC # BLD AUTO: 0 K/UL
NRBC BLD-RTO: 0 /100 WBC (ref 0–0.2)
PH UR STRIP.AUTO: 6.5 [PH] (ref 5–8)
PLATELET # BLD AUTO: 272 K/UL (ref 164–446)
PMV BLD AUTO: 9.3 FL (ref 9–12.9)
POTASSIUM SERPL-SCNC: 3.7 MMOL/L (ref 3.6–5.5)
PROT SERPL-MCNC: 6.8 G/DL (ref 6–8.2)
PROT UR QL STRIP: NEGATIVE MG/DL
RBC # BLD AUTO: 4.47 M/UL (ref 4.2–5.4)
RBC UR QL AUTO: NEGATIVE
SODIUM SERPL-SCNC: 142 MMOL/L (ref 135–145)
SP GR UR STRIP.AUTO: 1.02
UROBILINOGEN UR STRIP.AUTO-MCNC: 1 EU/DL
WBC # BLD AUTO: 6.4 K/UL (ref 4.8–10.8)

## 2025-01-11 PROCEDURE — 81003 URINALYSIS AUTO W/O SCOPE: CPT

## 2025-01-11 PROCEDURE — 76705 ECHO EXAM OF ABDOMEN: CPT

## 2025-01-11 PROCEDURE — 85025 COMPLETE CBC W/AUTO DIFF WBC: CPT

## 2025-01-11 PROCEDURE — 99284 EMERGENCY DEPT VISIT MOD MDM: CPT

## 2025-01-11 PROCEDURE — 80053 COMPREHEN METABOLIC PANEL: CPT

## 2025-01-11 PROCEDURE — 700111 HCHG RX REV CODE 636 W/ 250 OVERRIDE (IP): Performed by: STUDENT IN AN ORGANIZED HEALTH CARE EDUCATION/TRAINING PROGRAM

## 2025-01-11 PROCEDURE — 96374 THER/PROPH/DIAG INJ IV PUSH: CPT

## 2025-01-11 PROCEDURE — 83690 ASSAY OF LIPASE: CPT

## 2025-01-11 PROCEDURE — 36415 COLL VENOUS BLD VENIPUNCTURE: CPT

## 2025-01-11 RX ORDER — ONDANSETRON 4 MG/1
4 TABLET, ORALLY DISINTEGRATING ORAL EVERY 6 HOURS PRN
Qty: 10 TABLET | Refills: 0 | Status: SHIPPED | OUTPATIENT
Start: 2025-01-11

## 2025-01-11 RX ORDER — ONDANSETRON 2 MG/ML
4 INJECTION INTRAMUSCULAR; INTRAVENOUS ONCE
Status: COMPLETED | OUTPATIENT
Start: 2025-01-11 | End: 2025-01-11

## 2025-01-11 RX ORDER — MORPHINE SULFATE 4 MG/ML
4 INJECTION INTRAVENOUS ONCE
Status: COMPLETED | OUTPATIENT
Start: 2025-01-11 | End: 2025-01-11

## 2025-01-11 RX ORDER — IBUPROFEN 400 MG/1
400 TABLET, FILM COATED ORAL EVERY 6 HOURS PRN
Qty: 30 TABLET | Refills: 0 | Status: SHIPPED | OUTPATIENT
Start: 2025-01-11

## 2025-01-11 RX ORDER — ACETAMINOPHEN 325 MG/1
650 TABLET ORAL EVERY 6 HOURS PRN
Qty: 30 TABLET | Refills: 0 | Status: SHIPPED | OUTPATIENT
Start: 2025-01-11

## 2025-01-11 RX ADMIN — MORPHINE SULFATE 4 MG: 4 INJECTION INTRAVENOUS at 05:39

## 2025-01-11 RX ADMIN — ONDANSETRON 4 MG: 2 INJECTION INTRAMUSCULAR; INTRAVENOUS at 05:38

## 2025-01-11 ASSESSMENT — PAIN DESCRIPTION - PAIN TYPE
TYPE: ACUTE PAIN

## 2025-01-11 NOTE — ED PROVIDER NOTES
ER Provider Note    Scribed for Diomedes Bright M.d. by Soumya Chery. 1/11/2025  5:09 AM    Primary Care Provider: LINDA Marroquin    CHIEF COMPLAINT   Chief Complaint   Patient presents with    Nausea/Vomiting/Diarrhea     Since 2230 last night    Abdominal Pain     RUQ pain that radiates to her back since 2230; pt dx with gallstones in [facility] 3 weeks ago     EXTERNAL RECORDS REVIEWED  Outpatient orthopedic notes where patient has been seen for carpal tunnel release, had a canceled procedure for L1-L2 operative intervention    HPI/ROS  LIMITATION TO HISTORY   Select: Language Tamazight,  Used   OUTSIDE HISTORIAN(S):  Family is present at bedside.    Mary Carter is a 69 y.o. female who presents to the ED complaining of right upper quadrant pain that radiates to her back onset 7 hours ago with associated nausea, vomiting and diarrhea without blood. Patient reports that she frequently feels this pain. Denies nausea at this time. Patient took Aleve with little alleviation. She was diagnosed with a 1 cm gallstone 3 weeks ago. Medical history includes hypertension, hyperlipidemia, and arthritis. Surgical history includes appendectomy.    PAST MEDICAL HISTORY  Past Medical History:   Diagnosis Date    Arthritis     osteo, back and hands    Gallstones 12/2024    High cholesterol     Hypertension 10/31/2019    hx of, on no meds at this time    Osteoporosis        SURGICAL HISTORY  Past Surgical History:   Procedure Laterality Date    SC NEUROPLASTY & OR TRANSPOS MEDIAN NRV CARPAL JACQUELINE Left 11/2/2019    Procedure: RELEASE, CARPAL TUNNEL;  Surgeon: Nikunj Vela M.D.;  Location: SURGERY [facility];  Service: Orthopedics    APPENDECTOMY      HYSTERECTOMY, TOTAL ABDOMINAL      SHOULDER SURGERY Right        FAMILY HISTORY  History reviewed. No pertinent family history.    SOCIAL HISTORY   reports that she has never smoked. She has never used smokeless tobacco. She reports that she does not drink  "alcohol and does not use drugs.    CURRENT MEDICATIONS  Previous Medications    ACETAMINOPHEN (TYLENOL) 650 MG CR TABLET    Take 650 mg by mouth as needed. Indications: Pain    ATORVASTATIN (LIPITOR) 20 MG TAB    Take 20 mg by mouth every evening.    AZITHROMYCIN (ZITHROMAX) 250 MG TAB    Take 2 tablets PO on day one, then 1 tablet PO on day two to five.    CALCIUM PO    Take  by mouth every bedtime.    GABAPENTIN (NEURONTIN) 300 MG CAP    TAKE 1 TO 2 CAPSULES BY MOUTH TWICE A DAY    OMEGA-3 FATTY ACIDS (FISH OIL PO)    Take  by mouth every day.    OMEPRAZOLE (PRILOSEC) 20 MG DELAYED-RELEASE CAPSULE    Take 10 mg by mouth every day.    RISEDRONATE (ACTONEL) 5 MG TAB    Take 5 mg by mouth every morning. Indications: Osteoporosis    VITAMIN D3, CHOLECALCIFEROL, 1000 UNIT TAB    Take 2,000 Units by mouth.    VITAMIN E PO    Take  by mouth every day.       ALLERGIES  Patient has no known allergies.    PHYSICAL EXAM  /83   Pulse 70   Temp 36.3 °C (97.3 °F) (Temporal)   Resp 14   Ht 1.575 m (5' 2\")   Wt 63 kg (138 lb 14.2 oz)   LMP  (LMP Unknown)   SpO2 99%   BMI 25.40 kg/m²   Constitutional: Awake and alert  HENT: Normal inspection  Eyes: Normal inspection  Neck: Grossly normal range of motion.  Cardiovascular: Normal heart rate, Normal rhythm.  Symmetric peripheral pulses.   Thorax & Lungs: No respiratory distress, No wheezing, No rales, No rhonchi, No chest tenderness.   Abdomen: Bowel sounds normal, soft, non-distended, palpation in epigastric region and right upper quadrant, no mass  Skin: No obvious rash.  Back: No tenderness, No CVA tenderness.   Extremities: No clubbing, cyanosis, edema, no Homans or cords.  Neurologic: Grossly normal   Psychiatric: Normal for situation     DIAGNOSTIC STUDIES    EKG/LABS  Labs Reviewed   CBC WITH DIFFERENTIAL - Abnormal; Notable for the following components:       Result Value    RDW 51.8 (*)     Eosinophils 8.00 (*)     All other components within normal limits "   COMP METABOLIC PANEL - Abnormal; Notable for the following components:    Alkaline Phosphatase 114 (*)     All other components within normal limits   LIPASE   URINALYSIS   ESTIMATED GFR      I have independently interpreted this EKG    RADIOLOGY/PROCEDURES   The attending emergency physician has independently interpreted the diagnostic imaging associated with this visit and am waiting the final reading from the radiologist.   My preliminary interpretation is a follows: Lithiasis without evidence of acute cholecystitis    Radiologist interpretation:  US-RUQ   Final Result         1. Cholelithiasis.   2. Otherwise, negative.          COURSE & MEDICAL DECISION MAKING     ASSESSMENT, COURSE AND PLAN  Care Narrative:     5:09 AM - Patient seen and examined at bedside.  Patient presenting to the emergency department with known history of gallstones, has had right upper quadrant abdominal pain, nausea and 1 episode of nonbloody/nonbilious emesis since approximately 850 last night.  On exam she did have tenderness palpation the right upper quadrant, is otherwise hemodynamically stable.  Discussed plan of care, including labs and imaging. Patient agrees to the plan of care. The patient will be medicated with morphine 4 mg/mL injection 4 mg, zofran injection 4 mg. Ordered for US-RUQ, CBC with diff, CMP, lipase, urinalysis to evaluate her symptoms.      6:24 AM - Patient was reevaluated at bedside. Patient's pain is better.  Repeat abdominal exam is benign.  I informed the patient that her labs are reassuring and that we are still waiting on the ultrasound.    ReSound showed cholelithiasis without signs of acute cholecystitis.  Differential diagnosis considered.  Patient presentation consistent with cholelithiasis, biliary colic.  Doubt acute cholecystitis, ascending cholangitis or other surgical emergency.  Patient was treated and had improvement in symptoms here.  I will write a referral for general surgery.  Patient has  a PCP which she can follow-up with.  Will be given prescription for antiemetic, anti-inflammatory and analgesic at home.  Given strict return precautions and anticipatory guidance which herself and family understood at time of discharge    6:35 AM - Patient was reevaluated at bedside. Discussed plan for discharge, including general surgeon referral. Patient is agreeable to the plan.        ADDITIONAL PROBLEM LIST  None    DISPOSITION AND DISCUSSIONS  I have discussed management of the patient with the following physicians and CATALINA's:  None    Discussion of management with other QHP or appropriate source(s): None     Escalation of care considered, and ultimately not performed: acute inpatient care management, however at this time, the patient is most appropriate for outpatient management.    Barriers to care at this time, including but not limited to:  None .     Decision tools and prescription drugs considered including, but not limited to: Pain Medications   .     The patient will return for new or worsening symptoms and is stable at the time of discharge.    The patient is referred to a primary physician for blood pressure management, diabetic screening, and for all other preventative health concerns.    DISPOSITION:  Patient will be discharged home in stable condition.    FOLLOW UP:  ROSALEE Martinez  [address] [place] [id number]  [phone]    Schedule an appointment as soon as possible for a visit         OUTPATIENT MEDICATIONS:  New Prescriptions    ACETAMINOPHEN (TYLENOL) 325 MG TAB    Take 2 Tablets by mouth every 6 hours as needed for Mild Pain.    IBUPROFEN (MOTRIN) 400 MG TAB    Take 1 Tablet by mouth every 6 hours as needed for Moderate Pain.    ONDANSETRON (ZOFRAN ODT) 4 MG TABLET DISPERSIBLE    Take 1 Tablet by mouth every 6 hours as needed for Nausea/Vomiting.        FINAL DIANGOSIS  1. Calculus of gallbladder without cholecystitis without obstruction Acute   2. Biliary colic Acute        I,  Soumya Chery (Dilan), am scribing for, and in the presence of, Diomedes Bright M.D..    Electronically signed by: Soumya Chery (Dilan), 1/11/2025    IDiomedes M.D. personally performed the services described in this documentation, as scribed by Soumya Chery in my presence, and it is both accurate and complete.      The note accurately reflects work and decisions made by me.  Diomedes Bright M.D.  1/11/2025  8:05 AM

## 2025-01-11 NOTE — ED NOTES
Pt ready for discharge. Discharge education was provided to pt by this RN using . Pt verbalized understanding & all questions were answered. IV removed. Pt AoX 4. Pt ambulated out of the ED with all belongings. Pt encouraged to come back if symptoms worsen.

## 2025-01-11 NOTE — ED TRIAGE NOTES
Chief Complaint   Patient presents with    Nausea/Vomiting/Diarrhea     Since 2230 last night    Abdominal Pain     RUQ pain that radiates to her back since 2230; pt dx with gallstones in [facility] 3 weeks ago     Pt ambulatory to triage with family for above complaint. Pt speaks Urdu primarily and her family is translating at pt's request. Pt states she was dx with gallstones while visiting [facility] recently and was told she would probably have to have her gallbladder taken out. Tonight's episode of pain started when she went to bed around 2230. Abdominal pain protocol ordered.    Pt is alert/oriented and follows commands. Pt speaking in full sentences and responds appropriately to questions. No acute distress noted in triage and respirations are even and unlabored.     Pt placed in phlebotomy chairs and educated on triage process. Pt and family encouraged to alert staff for any changes in condition.

## 2025-01-11 NOTE — DISCHARGE INSTRUCTIONS
You are seen and evaluated in the emergency department for your abdominal pain.  Your labs are largely normal.  Your ultrasound showed gallstones without evidence of gallbladder wall infection.  He will be prescribed some medications for home to manage the symptoms.  I have put in a referral for you to establish with general surgery but I do recommend that you follow-up with your primary care doctor.  If you have any symptoms that are persistent beyond 4 hours despite taking medications please return to the emergency department for further evaluation.

## 2025-02-21 ENCOUNTER — HOSPITAL ENCOUNTER (OUTPATIENT)
Facility: MEDICAL CENTER | Age: 70
End: 2025-02-21
Attending: SURGERY | Admitting: SURGERY
Payer: COMMERCIAL

## 2025-05-02 ENCOUNTER — TELEPHONE (OUTPATIENT)
Dept: HEALTH INFORMATION MANAGEMENT | Facility: OTHER | Age: 70
End: 2025-05-02
Payer: COMMERCIAL

## 2025-07-29 ENCOUNTER — APPOINTMENT (OUTPATIENT)
Dept: RADIOLOGY | Facility: IMAGING CENTER | Age: 70
End: 2025-07-29
Payer: MEDICAID

## 2025-08-13 ENCOUNTER — TELEPHONE (OUTPATIENT)
Dept: MEDICAL GROUP | Facility: PHYSICIAN GROUP | Age: 70
End: 2025-08-13
Payer: MEDICARE

## 2025-08-23 ENCOUNTER — HOSPITAL ENCOUNTER (OUTPATIENT)
Dept: RADIOLOGY | Facility: MEDICAL CENTER | Age: 70
End: 2025-08-23
Payer: MEDICARE

## 2025-08-23 DIAGNOSIS — M54.16 LUMBAR RADICULOPATHY: ICD-10-CM

## 2025-08-23 PROCEDURE — 72148 MRI LUMBAR SPINE W/O DYE: CPT

## (undated) DEVICE — GOWN WARMING STANDARD FLEX - (30/CA)

## (undated) DEVICE — GLOVE BIOGEL INDICATOR SZ 8.5 SURGICAL PF LTX - (50/BX 4BX/CA)

## (undated) DEVICE — PACK LOWER EXTREMITY - (2/CA)

## (undated) DEVICE — SUCTION TIP STRAIGHT ARGYLE - 50EA/CA

## (undated) DEVICE — GOWN SURGICAL XX-LARGE - (28EA/CA) SIRUS NON REINFORCED

## (undated) DEVICE — SET LEADWIRE 5 LEAD BEDSIDE DISPOSABLE ECG (1SET OF 5/EA)

## (undated) DEVICE — MASK ANESTHESIA ADULT  - (100/CA)

## (undated) DEVICE — CHLORAPREP 26 ML APPLICATOR - ORANGE TINT(25/CA)

## (undated) DEVICE — SUCTION INSTRUMENT YANKAUER BULBOUS TIP W/O VENT (50EA/CA)

## (undated) DEVICE — PADDING CAST 4 IN STERILE - 4 X 4 YDS (24/CA)

## (undated) DEVICE — ELECTRODE 850 FOAM ADHESIVE - HYDROGEL RADIOTRNSPRNT (50/PK)

## (undated) DEVICE — CANISTER SUCTION 3000ML MECHANICAL FILTER AUTO SHUTOFF MEDI-VAC NONSTERILE LF DISP  (40EA/CA)

## (undated) DEVICE — GLOVE BIOGEL SZ 8.5 SURGICAL PF LTX - (50PR/BX 4BX/CA)

## (undated) DEVICE — SUTURE 2-0 VICRYL PLUS SH - 27 INCH (36/BX)

## (undated) DEVICE — SODIUM CHL IRRIGATION 0.9% 1000ML (12EA/CA)

## (undated) DEVICE — SENSOR SPO2 NEO LNCS ADHESIVE (20/BX) SEE USER NOTES

## (undated) DEVICE — ELECTRODE DUAL RETURN W/ CORD - (50/PK)

## (undated) DEVICE — PROTECTOR ULNA NERVE - (36PR/CA)

## (undated) DEVICE — DRAPE LARGE 3 QUARTER - (20/CA)

## (undated) DEVICE — KIT ANESTHESIA W/CIRCUIT & 3/LT BAG W/FILTER (20EA/CA)

## (undated) DEVICE — HEAD HOLDER JUNIOR/ADULT

## (undated) DEVICE — NEPTUNE 4 PORT MANIFOLD - (20/PK)

## (undated) DEVICE — SUTURE 3-0 ETHILON FS-1 - (36/BX) 30 INCH

## (undated) DEVICE — BANDAGE ELASTIC 4 HONEYCOMB - 4"X5YD LF (20/CA)"

## (undated) DEVICE — LENS/HOOD FOR SPACESUIT - (32/PK) PEEL AWAY FACE

## (undated) DEVICE — SLEEVE, VASO, THIGH, MED

## (undated) DEVICE — TUBING CLEARLINK DUO-VENT - C-FLO (48EA/CA)

## (undated) DEVICE — SET EXTENSION WITH 2 PORTS (48EA/CA) ***PART #2C8610 IS A SUBSTITUTE*****

## (undated) DEVICE — KIT ROOM DECONTAMINATION

## (undated) DEVICE — SUTURE GENERAL

## (undated) DEVICE — LACTATED RINGERS INJ 1000 ML - (14EA/CA 60CA/PF)